# Patient Record
Sex: MALE | URBAN - METROPOLITAN AREA
[De-identification: names, ages, dates, MRNs, and addresses within clinical notes are randomized per-mention and may not be internally consistent; named-entity substitution may affect disease eponyms.]

---

## 2017-04-17 ENCOUNTER — OFFICE VISIT (OUTPATIENT)
Dept: FAMILY MEDICINE | Facility: CLINIC | Age: 22
End: 2017-04-17

## 2017-04-17 ENCOUNTER — MEDICAL CORRESPONDENCE (OUTPATIENT)
Dept: HEALTH INFORMATION MANAGEMENT | Facility: CLINIC | Age: 22
End: 2017-04-17

## 2017-04-17 DIAGNOSIS — S93.492A SPRAIN OF OTHER LIGAMENT OF LEFT ANKLE, INITIAL ENCOUNTER: Primary | ICD-10-CM

## 2017-04-17 NOTE — MR AVS SNAPSHOT
"              After Visit Summary   2017    Mauro Rios    MRN: [id number]           Patient Information     Date Of Birth          1995        Visit Information        Provider Department      2017 8:45 PM Clinician, [facility]        Today's Diagnoses     Sprain of other ligament of left ankle, initial encounter    -  1       Follow-ups after your visit        Who to contact     Please call your clinic at [phone] to:    Ask questions about your health    Make or cancel appointments    Discuss your medicines    Learn about your test results    Speak to your doctor   If you have compliments or concerns about an experience at your clinic, or if you wish to file a complaint, please contact [facility] Patient Relations at [phone] or email us at [email or web address]         Additional Information About Your Visit        MyChart Information     WinProbe is an electronic gateway that provides easy, online access to your medical records. With WinProbe, you can request a clinic appointment, read your test results, renew a prescription or communicate with your care team.     To sign up for [employer] visit the website at [email or web address]   You will be asked to enter the access code listed below, as well as some personal information. Please follow the directions to create your username and password.     Your access code is: [email or web address]  Expires: 2017 10:57 AM     Your access code will  in 90 days. If you need help or a new code, please contact your [facility] or call [phone] for assistance.        Care EveryWhere ID     This is your Care EveryWhere ID. This could be used by other organizations to access your [place] medical records  [id number]        Your Vitals Were     Pulse Temperature Respirations Height Pulse Oximetry BMI (Body Mass Index)    66 98.6  F (37  C) (Oral) 13 5' 9\" " (175.3 cm) 98% 31.99 kg/m2       Blood Pressure from Last 3 Encounters:   04/17/17 132/76    Weight from Last 3 Encounters:   04/17/17 216 lb 9.6 oz (98.2 kg)              Today, you had the following     No orders found for display       Primary Care Provider    None Specified       No primary provider on file.        Thank you!     Thank you for choosing [facility]  for your care. Our goal is always to provide you with excellent care. Hearing back from our patients is one way we can continue to improve our services. Please take a few minutes to complete the written survey that you may receive in the mail after your visit with us. Thank you!             Your Updated Medication List - Protect others around you: Learn how to safely use, store and throw away your medicines at www.disposemymeds.org.          This list is accurate as of: 4/17/17 11:59 PM.  Always use your most recent med list.                   Brand Name Dispense Instructions for use    NAPROXEN SODIUM PO      Take 550 mg by mouth daily as needed for moderate pain (He has been taking one 550 mg tablet per day for left ankle pain.)

## 2017-04-17 NOTE — Clinical Note
I have completed my note, please review, add tie in statement and close encounter. Please note, I am unable to access EPIC currently, so I have had to login under another pharmacy student's information. My email address is [email or web address], if you have any changes or feedback for me. I apologize if you receive two of these messages, as I didn't get confirmation that this sent the first time, and so I am resending it to make sure. Thank you for your help!  Thanks!   Leona Simental

## 2017-04-17 NOTE — Clinical Note
Hi Dr. Harman,    Thank you again for working with us at [facility] the other night! Here is one patient we saw together. I hope the rest of your week goes well!  Zeyad Trimble, MS1

## 2017-04-17 NOTE — Clinical Note
I have completed my note, please review, add tie in statement and close encounter. Please note, I am having trouble with my EPIC login, so I have logged in with another pharmacy student volunteer's information. If you have any changes you would like me to make, my email address is [email or web address]. I really appreciate your help!  Thanks!   Leona Simental

## 2017-04-19 VITALS
HEIGHT: 69 IN | TEMPERATURE: 98.6 F | DIASTOLIC BLOOD PRESSURE: 76 MMHG | SYSTOLIC BLOOD PRESSURE: 132 MMHG | BODY MASS INDEX: 32.08 KG/M2 | WEIGHT: 216.6 LBS | HEART RATE: 66 BPM | RESPIRATION RATE: 13 BRPM | OXYGEN SATURATION: 98 %

## 2017-04-19 NOTE — PROGRESS NOTES
"PHARMACEUTICAL CARE NOTE    Date of Service:4/19/2017   Patient Name: Mauro Rios   YOB: 1995   MRN: [id number]   Phone Number: [phone] (home)     No Known Allergies     Health Conditions (including date of diagnosis):      History reviewed. No pertinent past medical history.       Current Outpatient Prescriptions   Medication Sig Dispense Refill     NAPROXEN SODIUM PO Take 550 mg by mouth daily as needed for moderate pain (He has been taking one 550 mg tablet per day for left ankle pain.)         Social History     Social History     Marital status: Single     Spouse name: N/A     Number of children: N/A     Years of education: N/A     Social History Main Topics     Smoking status: Never Smoker     Smokeless tobacco: None     Alcohol use 0.6 oz/week     1 Cans of beer per week     Drug use: 2.00 per week     Special: Marijuana     Sexual activity: Not Asked     Other Topics Concern     None     Social History Narrative     None          There is no immunization history on file for this patient.      Summary of Visit:  Reason for encounter: ANGELIA presented with left ankle pain that has been going on for about 1 week. This occurred while playing soccer, and it hurt a lot at first, but now ANGELIA reports his pain being at 3-4/10. ANGELIA can walk on it but favors the left ankle and range of motion is limited.     Physical therapy consulted with ANGELIA and diagnosed an anterior talofibular ligament sprain. They gave him stretches and other techniques to help him regain normal use. ANGELIA reported being satisfied with the help physical therapy had given him.    ANGELIA took one Ibuprofen 200 mg tablet per day the first two days after the sprain occurred, and has been taking one 550 mg Naproxen tablet daily (which he called \"Flanax\") for the last 3-4 days.     The medical preceptor recommended to continue taking the Naproxen as needed, and suggested taking it with food if side effects such as upset stomach should occur. She " "also recommended heat, ice, and resting the ankle.    ANGELIA reports drinking 3 energy drinks per week, drinking about a couple of beers per month, no tobacco use, and smoking marijuana a couple of times per week. He did not get a flu shot this year. His vitals were taken at the clinic and he was 69\" tall, weighed 216.6 pounds, therefore having a BMI of 32. In addition, his blood pressure was 132/76, heart rate was 66, his respiration rate was 13, oxygen was 98, and temperature was 98.6. ANGELIA reports a family history of type 2 diabetes mellitus on his maternal side.     Condition 1: Left ankle sprain    ANGELIA has no drug therapy problems at this time.       Pharm Clinician: Leona Simental  Preceptors Involved in Service: Jluis Cruz    In supervising the pharmacy student, I reviewed the exam documented above with the student.  I have reviewed and verified the student s documentation.  Supervising Provider: Danny AvinaD, BCPS.     Patient presents for evaluation of an ankle injury.  Has been taking naproxen and reports no adverse effects,  Recommend to continue this therapy along with the recommendations by Physical Therapy.   HPI      ROS      Physical Exam      "

## 2017-04-19 NOTE — PROGRESS NOTES
"MEDICINE NOTE    SUBJECTIVE:  Mauro Rios is a 22 year-old-man presenting with left ankle pain following a soccer accident one week ago. Another player had stepped on the medial side of his left foot with cleats and he fell to the ground.  The pain was originally sharp in his entire foot but is now more in his ankle and characterized as dull, and only provoked when moving his ankle. The first two days after the injury he took one ibuprofen a day, and then switched to Flamex (naproxen) for the remaining five days until now, which helped the swelling go down. His pain is controlled at a 3-4/10 but he described it as \"uncomfortable\". The first day he chose not to put weight on the ankle but since then it has been lightly walking on the foot. He is concerned about limping too much by favoring his foot, and that he walks on the lateral edge of his foot.     REVIEW OF SYSTEMS:  Musculoskeletal: joint and muscle pain on lower lateral left ankle. Small amount of swelling. Concerned about ambulating more on the lateral edge of his foot.   Skin: no concerning lesions near site of injury  Neuro: no loss of strength or sensation, no numbness or tingling,   Psych: no depression or anxiety. Once or twice a week has trouble falling asleep due to stress.     History reviewed. No pertinent past medical history.    History reviewed. No pertinent surgical history.    Family History   Problem Relation Age of Onset     DIABETES Other      Patient knew several family members on his mother's side have Db       Social History     Social History     Marital status: Single     Spouse name: N/A     Number of children: N/A     Years of education: N/A     Occupational History     Not on file.     Social History Main Topics     Smoking status: Never Smoker     Smokeless tobacco: Not on file     Alcohol use 0.6 oz/week     1 Cans of beer per week     Drug use: 2.00 per week     Special: Marijuana     Sexual activity: Not on file   Patient " "expressed discontent with his weight and knew he had an unhealthy lifestyle.     Other Topics Concern     Not on file     Social History Narrative     No narrative on file       OBJECTIVE:  Physical Exam:  /76 (BP Location: Right arm, Patient Position: Chair, Cuff Size: Adult Regular)  Pulse 66  Temp 98.6  F (37  C) (Oral)  Resp 13  Ht 5' 9\" (175.3 cm)  Wt 216 lb 9.6 oz (98.2 kg)  SpO2 98%  BMI 31.99 kg/m2  Constitutional: no distress, comfortable, pleasant   Musculoskeletal: full range of motion in left ankle though painful with dorsi and plantar flexion. Tenderness to palpation on the lower lateral left ankle. Some swelling on both sides of the left ankle, with the lateral side more prominent. Walked with a small limp with slight inversion of the left ankle.    Skin: no concerning lesions or ecchymosis   Neurological: normal strength and sensation though some pain when stretching to the limit.    Psychological: appropriate mood     ASSESSMENT/PLAN:  Mauro was seen today for musculoskeletal problem.    Diagnoses and all orders for this visit:    Sprain of anterior talofibular ligament of left ankle, initial encounter  Assessment: Based of the pain in the left lateral ankle, the PT review, and mechanism of injury in patient history, the injury seems consistent with left anterior talofibular ankle sprain. It seems less likely there is a fracture because he is able to walk on his ankle and the swelling has reduced. Pain is only mild or moderate and his range of motion is intact.   Plan:   - Manage pain with continued naproxen PRN  - Rest, Ice, Compression with an ACE bandage, and Elevation (RICE). PT explained how to wrap his ankle and gave him a video  - Mobilize as able to help the healing process  - Return to the clinic in two weeks if his ankle hasn't improved or his condition worsens    Obesity  Assessment: While doing intake the patient expressed discontent at his weight. His BMI is 32 which is " considered obese.  Plan: We discussed the nutritional services at our clinic with him and gave a fast pass if he'd like to come back. There wasn't time at this visit for them to meet.     Med Clinician: Jhon Alvarez, MS3 and Zeyad Trimble MS1  Preceptor: Dr. Brittani Martin    In supervising the medical student, I repeated the exam documented above.  I have reviewed and verified the student s documentation.  Supervising Provider: Dr. Brittani Martin  Patient with anterior ankle sprain.  Reviewed with PT and given exercises for strengthening and stretching.

## 2017-04-19 NOTE — NURSING NOTE
PHQ-2 of 0.  On further questioning, he had little energy and some trouble sleeping from stress and his ankle pain.

## 2017-05-01 NOTE — PROGRESS NOTES
PHYSICAL THERAPY NOTE    Problem: Pt is a 21 yo male  with complaint of L ankle pain due to contact injury while playing soccer 1 week prior.     Subjective  Chief complaint: Pt reports L ankle injury sustained while playing soccer when opposing player s cleats struck his medial ankle causing inversion. Pt reports initial numbness with minimal pain onset occurring later in the evening. Pain significantly increased 2 days following the injury and rated 7-8/10. He reports possibly hearing  cracking  and was unable to weight bear immediately following injury and needed to be carried off the field. Pt self-managed injury with use of crutches due to continued pain with weight bearing and use of walking boot for a few days. Pt currently walking independently without assistive device or appliance. Pt missed two days of work but has since worked with moderate difficulty due to pain. He stated swelling has decreased significantly since injury but still has bruising on both sides of ankle. Pt currently reports no pain at rest and 3-4/10 pain with walking. He states that he has begun walking on the outside of his foot and is hesitant to put full weight on L foot. Pt reports using cold/heat and flanax for pain control with moderate relief.    PMH: Pt reports previous injury to L ankle 2 years prior while playing soccer when his L ankle rolled underneath him while sliding.     Medications: Ibuprofen, flanax    Living/Occupation: Lives in apartment with girlfriend who is able to assist when needed. Elevator available for use. Works as  and is able to sit when needed for pain relief.     Social/Hobbies: Reports drinking 3 caffeinated energy drinks per week and 5 ETOH drinks per month. Denies tobacco use. Plays soccer 2x/week and expressed interest in working out at gym for weight management after recovery.    Patient goals: Reduced ankle pain so he can play soccer and go to the gym.    Objective  Observation:  Noted bruising on medial ankle secondary to impact with soccer cleats and lateral ankle below lateral malleolus along lateral foot extending to base of 5th metatarsal. Swelling in ankle and proximal foot noted on L. Ankle and foot posturing symmetrical with weight bearing.    Gait Assessment: Antalgic gait with decreased stance time and slight ankle inversion on L.    Anthropometrics: Swelling measured R=54cm and L=54.7cm (Figure-8 method with tape measure).    AROM: R ankle DF/PF 8-0-44. L ankle DF/PF 0-3-45. Ankle inversion limited on L due to pain at lateral ankle.    PROM: L ankle inversion limited due to pain on lateral ankle. All other motions WNL bilateral.    Palpation: Tenderness noted at L lateral malleolus and on L ATF ligament    Special tests:  Medial talar tilt (-), some pain noted at lateral ankle  Lateral talar tilt (-)  Anterior drawer (-)     Treatment: Educated pt on RICE principle. Demonstrated ankle taping procedure for edema management and ankle support; pt video recorded on cell phone to show girlfriend for assistance. Instructed in ankle pumps (20-30 reps, 3 sets per day) and towel stretch (30-60s without pain, 3x/day) to increase DF ROM. Encouraged continued icing for pain and edema management.     Assessment: Pt is a 23 yo male with L lateral ankle sprain secondary to contact soccer injury. Pt presents with L ankle pain provoked by weight bearing while walking and reduced DF motion. These impairments reduce his activity tolerance, including exercise and work as . Pt s rehabilitation potential is excellent due to age, motivation, and nature of injury.    Plan: See in 3-4 weeks if improvement plateaus, sooner self-management of symptoms is unsuccessful. Address any residual ROM deficits and begin ankle strengthening and balance exercises (ex. Star Excursion or disturbed surface).     Goals:  1. In 4 weeks, patient will have decreased L ankle edema to match R to decrease pain and  improve AROM with L inversion for increased ability to stand for his normal wprk day  2. In 4 weeks, patient will have increased L ankle DF to match R to improve L stance time and eliminate antalgic gait to be able to return to full play of soccer.    Axel Ashley, SPT  Yumiko Miranda, SPT  Cnocepcion Ontiveros, PT